# Patient Record
Sex: FEMALE | Race: OTHER | NOT HISPANIC OR LATINO | Employment: OTHER | URBAN - METROPOLITAN AREA
[De-identification: names, ages, dates, MRNs, and addresses within clinical notes are randomized per-mention and may not be internally consistent; named-entity substitution may affect disease eponyms.]

---

## 2017-06-07 NOTE — PATIENT DISCUSSION
(H32.4830) Nonexudative age-related macular degeneration bilateral anthony - Assesment : Examination revealed AMD Dry- - Plan : Patient advised to check Amsler Grid regularly (once weekly or more) and use nutraceuticals such as AREDS 2 eye vitamins. Wear sunglasses when outdoors and eat green, leafy vegetables to maintain ocular health. Return to clinic in one year for Macular Fundus Photos and Exam. Optical coherence tomography performed.

## 2018-06-14 NOTE — PATIENT DISCUSSION
(X20.3246) Nonexudative age-related macular degeneration bilateral anthony - Assesment : Examination revealed AMD Dry. OD visual acuity decreased and not entirely consistent with exam findings. recommend further evaluation and testing of OD. - Plan : Patient advised to check Amsler Grid regularly (once weekly or more) and use nutraceuticals such as AREDS 2 eye vitamins. Wear sunglasses when outdoors and eat green, leafy vegetables to maintain ocular health.   Rtc 1 months follow up/refraction/octm(if no improvement in va OD consider VF)

## 2018-07-11 NOTE — PATIENT DISCUSSION
(M05.4441) Nonexudative age-related macular degeneration bilateral anthony - Assesment : Examination revealed AMD Dry. OD visual acuity improved on today findings since last visit Unable to examine patient,seconday to patient had to leave for another appt. Called patient with exam findings and oct findings - Plan : Patient advised to check Amsler Grid regularly (once weekly or more) and use nutraceuticals such as AREDS 2 eye vitamins. Wear sunglasses when outdoors and eat green, leafy vegetables to maintain ocular health.   Rtc 07/2019 exam-octm

## 2018-11-29 NOTE — PATIENT DISCUSSION
(H16.222) Keratoconjunct sicca, not specified as Sjogren's, left eye - Assesment : Examination revealed Dry Eye Syndrome - Plan : ATS OS 3-4X DAY  START MAXITROL KERRI OS QHS X 10 NIGHTS THEN STOP  7/2019 EXAM/ MAC OCT

## 2018-12-27 NOTE — PATIENT DISCUSSION
(C02.605) Keratoconjunct sicca, not specified as Sjogren's, left eye - Assesment : Examination revealed Dry Eye Syndrome.   PER PATIENT MAXITROL OINTMENT DIDNT HELP WITH THE DRY EYE. - Plan : LOTEMAX OINTMENT AT NIGHT FOR 10 DAYS (SAMPLES GIVEN) THEN SYSTANE GEL WEAR A SHIELD AT NIGHT OS, WONDERING IF THE EYE IS RUBBING ON THE PILLOW AND ITS IRRITATING IT    7/2019 EXAM/ MAC OCT

## 2019-01-28 NOTE — PATIENT DISCUSSION
(H01.001) Unspecified blepharitis right upper eyelid - Assesment : Examination revealed Blepharitis. - Plan : RE START MAXITROL KERRI TO LASHES AND SMALL AMOUNT INSIDE EYES QHS X 10 NIGHTS THEN STOP  WARM SOAKS BEFORE APPLYING KERRI  OCUSOFT SCRUBS QHS 3-4 NIGHTS WEEK.  -SAMPLE TO PT . GTT INSTRUCTIONS TO PT AND SPOUSE  7/2019 EXAM / MAC OCT

## 2019-01-28 NOTE — PATIENT DISCUSSION
(H01.004) Unspecified blepharitis left upper eyelid - Assesment : Examination revealed Blepharitis.  - Plan : SEE PLAN 1

## 2019-11-13 ENCOUNTER — NEW PATIENT COMPREHENSIVE (OUTPATIENT)
Dept: URBAN - METROPOLITAN AREA CLINIC 35 | Facility: CLINIC | Age: 74
End: 2019-11-13

## 2019-11-13 DIAGNOSIS — E11.9: ICD-10-CM

## 2019-11-13 DIAGNOSIS — H25.813: ICD-10-CM

## 2019-11-13 DIAGNOSIS — H40.023: ICD-10-CM

## 2019-11-13 PROCEDURE — 92015 DETERMINE REFRACTIVE STATE: CPT

## 2019-11-13 PROCEDURE — 92004 COMPRE OPH EXAM NEW PT 1/>: CPT

## 2019-11-13 ASSESSMENT — VISUAL ACUITY
OS_SC: 20/40-2
OS_CC: J8
OD_CC: 20/40
OD_SC: 20/60-2
OD_CC: J8-
OS_CC: 20/30-1

## 2019-11-13 ASSESSMENT — TONOMETRY
OS_IOP_MMHG: 16
OD_IOP_MMHG: 15
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
## 2021-02-10 NOTE — PATIENT DISCUSSION
CHALAZION OS:  PROCEED WITH INSISION AND DRAINAGE AND INJECT WITH KENALOGPRESCRIBED WARM COMPRESSES, EYELID SCRUBS AND DOXYCYCLINE 100MG BID PO X 2 WEEKS AND TOBRADEX HARSHAD QHS X 2 WEEKS.

## 2021-02-10 NOTE — PATIENT DISCUSSION
New Prescription: doxycycline hyclate (doxycycline hyclate): tablet: 100 mg 1 tablet twice a day by mouth 02-

## 2021-02-15 ENCOUNTER — ESTABLISHED COMPREHENSIVE EXAM (OUTPATIENT)
Dept: URBAN - METROPOLITAN AREA CLINIC 35 | Facility: CLINIC | Age: 76
End: 2021-02-15

## 2021-02-15 DIAGNOSIS — H35.371: ICD-10-CM

## 2021-02-15 DIAGNOSIS — H40.023: ICD-10-CM

## 2021-02-15 DIAGNOSIS — H25.813: ICD-10-CM

## 2021-02-15 DIAGNOSIS — E11.9: ICD-10-CM

## 2021-02-15 DIAGNOSIS — H52.7: ICD-10-CM

## 2021-02-15 PROCEDURE — 92014 COMPRE OPH EXAM EST PT 1/>: CPT

## 2021-02-15 PROCEDURE — 92015 DETERMINE REFRACTIVE STATE: CPT

## 2021-02-15 PROCEDURE — 92133 CPTRZD OPH DX IMG PST SGM ON: CPT

## 2021-02-15 ASSESSMENT — TONOMETRY
OD_IOP_MMHG: 19
OD_IOP_MMHG: 20
OS_IOP_MMHG: 20

## 2021-02-15 ASSESSMENT — VISUAL ACUITY
OS_SC: 20/60-2
OS_CC: J4-
OD_CC: J3-
OD_PH: 20/40
OD_SC: 20/60-2
OS_PH: 20/30-1

## 2021-03-01 ENCOUNTER — TECH ONLY (OUTPATIENT)
Dept: URBAN - METROPOLITAN AREA CLINIC 39 | Facility: CLINIC | Age: 76
End: 2021-03-01

## 2021-03-01 DIAGNOSIS — H40.023: ICD-10-CM

## 2021-03-01 DIAGNOSIS — H40.1130: ICD-10-CM

## 2021-03-01 PROCEDURE — 99211T TECH SERVICE

## 2021-03-01 PROCEDURE — 92083 EXTENDED VISUAL FIELD XM: CPT

## 2021-03-16 ENCOUNTER — FOLLOW UP (OUTPATIENT)
Dept: URBAN - METROPOLITAN AREA CLINIC 35 | Facility: CLINIC | Age: 76
End: 2021-03-16

## 2021-03-16 DIAGNOSIS — E11.3293: ICD-10-CM

## 2021-03-16 DIAGNOSIS — H35.351: ICD-10-CM

## 2021-03-16 PROCEDURE — 92012 INTRM OPH EXAM EST PATIENT: CPT

## 2021-03-16 PROCEDURE — 92134 CPTRZ OPH DX IMG PST SGM RTA: CPT

## 2021-03-16 ASSESSMENT — VISUAL ACUITY
OS_SC: 20/50-1
OD_SC: 20/50-2

## 2021-03-16 ASSESSMENT — TONOMETRY
OD_IOP_MMHG: 18
OS_IOP_MMHG: 17

## 2021-03-29 ENCOUNTER — CONSULT (OUTPATIENT)
Dept: URBAN - METROPOLITAN AREA CLINIC 39 | Facility: CLINIC | Age: 76
End: 2021-03-29

## 2021-03-29 DIAGNOSIS — H40.1112: ICD-10-CM

## 2021-03-29 DIAGNOSIS — H35.351: ICD-10-CM

## 2021-03-29 DIAGNOSIS — E11.3293: ICD-10-CM

## 2021-03-29 DIAGNOSIS — H35.371: ICD-10-CM

## 2021-03-29 DIAGNOSIS — H40.1121: ICD-10-CM

## 2021-03-29 PROCEDURE — 76514 ECHO EXAM OF EYE THICKNESS: CPT

## 2021-03-29 PROCEDURE — 92250 FUNDUS PHOTOGRAPHY W/I&R: CPT

## 2021-03-29 PROCEDURE — 92020 GONIOSCOPY: CPT

## 2021-03-29 PROCEDURE — 92014 COMPRE OPH EXAM EST PT 1/>: CPT

## 2021-03-29 ASSESSMENT — VISUAL ACUITY
OS_SC: 20/50-2
OD_CC: J3
OS_PH: 20/40
OD_SC: 20/50-2
OS_CC: J3
OD_PH: 20/40

## 2021-03-29 ASSESSMENT — TONOMETRY
OD_IOP_MMHG: 15
OS_IOP_MMHG: 15

## 2021-03-29 ASSESSMENT — PACHYMETRY
OS_CT_UM: 541
OD_CT_UM: 549

## 2021-04-01 ENCOUNTER — RETINA CONSULT (OUTPATIENT)
Dept: URBAN - METROPOLITAN AREA CLINIC 35 | Facility: CLINIC | Age: 76
End: 2021-04-01

## 2021-04-01 DIAGNOSIS — H35.371: ICD-10-CM

## 2021-04-01 DIAGNOSIS — E11.3212: ICD-10-CM

## 2021-04-01 DIAGNOSIS — E11.3211: ICD-10-CM

## 2021-04-01 DIAGNOSIS — E11.9: ICD-10-CM

## 2021-04-01 DIAGNOSIS — H35.033: ICD-10-CM

## 2021-04-01 PROCEDURE — 92235 FLUORESCEIN ANGRPH MLTIFRAME: CPT

## 2021-04-01 PROCEDURE — 92250 FUNDUS PHOTOGRAPHY W/I&R: CPT

## 2021-04-01 PROCEDURE — 92273 FULL FIELD ERG W/I&R: CPT

## 2021-04-01 PROCEDURE — 6702850 BILATERAL INTRAVITREAL INJECTION

## 2021-04-01 PROCEDURE — 92287 ANT SGM IMG IR FLRSCN ANGRPH: CPT

## 2021-04-01 PROCEDURE — 99214 OFFICE O/P EST MOD 30 MIN: CPT

## 2021-04-01 ASSESSMENT — VISUAL ACUITY
OS_SC: 20/50-2
OD_SC: 20/60-1
OS_PH: 20/40-2
OD_PH: 20/40-2

## 2021-04-01 ASSESSMENT — TONOMETRY
OS_IOP_MMHG: 14
OD_IOP_MMHG: 16

## 2021-05-06 ENCOUNTER — ESTABLISHED PATIENT (OUTPATIENT)
Dept: URBAN - METROPOLITAN AREA CLINIC 35 | Facility: CLINIC | Age: 76
End: 2021-05-06

## 2021-05-06 DIAGNOSIS — H43.813: ICD-10-CM

## 2021-05-06 DIAGNOSIS — E11.3211: ICD-10-CM

## 2021-05-06 DIAGNOSIS — H35.371: ICD-10-CM

## 2021-05-06 DIAGNOSIS — H35.033: ICD-10-CM

## 2021-05-06 DIAGNOSIS — E11.3212: ICD-10-CM

## 2021-05-06 PROCEDURE — 92134 CPTRZ OPH DX IMG PST SGM RTA: CPT

## 2021-05-06 PROCEDURE — 92202 OPSCPY EXTND ON/MAC DRAW: CPT

## 2021-05-06 PROCEDURE — 99213 OFFICE O/P EST LOW 20 MIN: CPT

## 2021-05-06 PROCEDURE — 6702850 BILATERAL INTRAVITREAL INJECTION

## 2021-05-06 ASSESSMENT — TONOMETRY
OD_IOP_MMHG: 13
OS_IOP_MMHG: 14

## 2021-05-06 ASSESSMENT — VISUAL ACUITY
OS_SC: 20/40-1
OD_SC: 20/60-2
OD_PH: 20/40-1

## 2021-05-10 ENCOUNTER — IOP CHECK (OUTPATIENT)
Dept: URBAN - METROPOLITAN AREA CLINIC 39 | Facility: CLINIC | Age: 76
End: 2021-05-10

## 2021-05-10 DIAGNOSIS — H40.1112: ICD-10-CM

## 2021-05-10 DIAGNOSIS — H40.1121: ICD-10-CM

## 2021-05-10 PROCEDURE — 92012 INTRM OPH EXAM EST PATIENT: CPT

## 2021-05-10 RX ORDER — DORZOLAMIDE HYDROCHLORIDE TIMOLOL MALEATE 20; 5 MG/ML; MG/ML: 1 SOLUTION/ DROPS OPHTHALMIC TWICE A DAY

## 2021-05-10 ASSESSMENT — VISUAL ACUITY
OS_PH: 20/30-2
OD_PH: 20/40-1
OS_SC: 20/40-2
OD_SC: 20/60+2

## 2021-05-10 ASSESSMENT — TONOMETRY
OS_IOP_MMHG: 11
OD_IOP_MMHG: 11

## 2021-06-10 ENCOUNTER — ESTABLISHED PATIENT (OUTPATIENT)
Dept: URBAN - METROPOLITAN AREA CLINIC 35 | Facility: CLINIC | Age: 76
End: 2021-06-10

## 2021-06-10 DIAGNOSIS — H35.033: ICD-10-CM

## 2021-06-10 DIAGNOSIS — E11.3211: ICD-10-CM

## 2021-06-10 DIAGNOSIS — E11.3212: ICD-10-CM

## 2021-06-10 DIAGNOSIS — H43.813: ICD-10-CM

## 2021-06-10 DIAGNOSIS — H35.371: ICD-10-CM

## 2021-06-10 PROCEDURE — 92250 FUNDUS PHOTOGRAPHY W/I&R: CPT

## 2021-06-10 PROCEDURE — 6702850 BILATERAL INTRAVITREAL INJECTION

## 2021-06-10 PROCEDURE — 92273 FULL FIELD ERG W/I&R: CPT

## 2021-06-10 PROCEDURE — 99213 OFFICE O/P EST LOW 20 MIN: CPT

## 2021-06-10 ASSESSMENT — VISUAL ACUITY
OS_CC: 20/50+2
OS_PH: 20/40
OD_CC: 20/40+1

## 2021-06-10 ASSESSMENT — TONOMETRY
OD_IOP_MMHG: 15
OS_IOP_MMHG: 15

## 2021-09-16 ENCOUNTER — FOLLOW UP (OUTPATIENT)
Dept: URBAN - METROPOLITAN AREA CLINIC 35 | Facility: CLINIC | Age: 76
End: 2021-09-16

## 2021-09-16 DIAGNOSIS — H43.813: ICD-10-CM

## 2021-09-16 DIAGNOSIS — H35.033: ICD-10-CM

## 2021-09-16 DIAGNOSIS — E11.3213: ICD-10-CM

## 2021-09-16 DIAGNOSIS — H35.371: ICD-10-CM

## 2021-09-16 PROCEDURE — 92287 ANT SGM IMG IR FLRSCN ANGRPH: CPT

## 2021-09-16 PROCEDURE — 92273 FULL FIELD ERG W/I&R: CPT

## 2021-09-16 PROCEDURE — 92250 FUNDUS PHOTOGRAPHY W/I&R: CPT

## 2021-09-16 PROCEDURE — 92235 FLUORESCEIN ANGRPH MLTIFRAME: CPT

## 2021-09-16 PROCEDURE — 99213 OFFICE O/P EST LOW 20 MIN: CPT

## 2021-09-16 ASSESSMENT — TONOMETRY
OD_IOP_MMHG: 13
OS_IOP_MMHG: 14

## 2021-09-16 ASSESSMENT — VISUAL ACUITY
OS_SC: 20/50+2
OD_SC: 20/40+2

## 2021-12-20 ENCOUNTER — COMPREHENSIVE EXAM (OUTPATIENT)
Dept: URBAN - METROPOLITAN AREA CLINIC 35 | Facility: CLINIC | Age: 76
End: 2021-12-20

## 2021-12-20 DIAGNOSIS — H35.033: ICD-10-CM

## 2021-12-20 DIAGNOSIS — E11.3212: ICD-10-CM

## 2021-12-20 DIAGNOSIS — H25.813: ICD-10-CM

## 2021-12-20 DIAGNOSIS — E11.3211: ICD-10-CM

## 2021-12-20 DIAGNOSIS — H43.813: ICD-10-CM

## 2021-12-20 DIAGNOSIS — H40.1112: ICD-10-CM

## 2021-12-20 DIAGNOSIS — H40.1121: ICD-10-CM

## 2021-12-20 DIAGNOSIS — H35.371: ICD-10-CM

## 2021-12-20 DIAGNOSIS — H52.7: ICD-10-CM

## 2021-12-20 PROCEDURE — 92014 COMPRE OPH EXAM EST PT 1/>: CPT

## 2021-12-20 PROCEDURE — 92134 CPTRZ OPH DX IMG PST SGM RTA: CPT

## 2021-12-20 PROCEDURE — 92015 DETERMINE REFRACTIVE STATE: CPT

## 2021-12-20 ASSESSMENT — TONOMETRY
OS_IOP_MMHG: 16
OD_IOP_MMHG: 16

## 2021-12-20 ASSESSMENT — VISUAL ACUITY
OS_CC: 20/40-1
OD_CC: J8
OS_CC: J10
OD_CC: 20/40-1

## 2022-07-13 ENCOUNTER — CONSULTATION/EVALUATION (OUTPATIENT)
Dept: URBAN - METROPOLITAN AREA CLINIC 39 | Facility: CLINIC | Age: 77
End: 2022-07-13

## 2022-07-13 DIAGNOSIS — H40.1112: ICD-10-CM

## 2022-07-13 DIAGNOSIS — H25.813: ICD-10-CM

## 2022-07-13 DIAGNOSIS — H40.1121: ICD-10-CM

## 2022-07-13 DIAGNOSIS — E11.3213: ICD-10-CM

## 2022-07-13 DIAGNOSIS — H43.813: ICD-10-CM

## 2022-07-13 PROCEDURE — 92250 FUNDUS PHOTOGRAPHY W/I&R: CPT

## 2022-07-13 PROCEDURE — 92020 GONIOSCOPY: CPT

## 2022-07-13 PROCEDURE — 92014 COMPRE OPH EXAM EST PT 1/>: CPT

## 2022-07-13 ASSESSMENT — VISUAL ACUITY
OS_SC: <J12
OD_CC: J1
OS_SC: 20/40+2
OS_CC: J1
OD_SC: 20/40+1
OD_CC: 20/30+2
OS_CC: 20/25-2
OD_SC: J12

## 2022-07-13 ASSESSMENT — TONOMETRY
OD_IOP_MMHG: 19
OS_IOP_MMHG: 19

## 2023-09-13 ENCOUNTER — COMPREHENSIVE EXAM (OUTPATIENT)
Dept: URBAN - METROPOLITAN AREA CLINIC 39 | Facility: CLINIC | Age: 78
End: 2023-09-13

## 2023-09-13 DIAGNOSIS — H25.813: ICD-10-CM

## 2023-09-13 DIAGNOSIS — H40.1112: ICD-10-CM

## 2023-09-13 DIAGNOSIS — H40.1121: ICD-10-CM

## 2023-09-13 PROCEDURE — 92250 FUNDUS PHOTOGRAPHY W/I&R: CPT

## 2023-09-13 PROCEDURE — 92014 COMPRE OPH EXAM EST PT 1/>: CPT

## 2023-09-13 ASSESSMENT — VISUAL ACUITY
OS_CC: J3
OD_CC: 20/30
OD_SC: J12
OS_CC: 20/40
OD_SC: 20/60+2
OS_SC: 20/50-1
OD_CC: J1
OS_SC: J12

## 2023-09-13 ASSESSMENT — TONOMETRY
OD_IOP_MMHG: 18
OS_IOP_MMHG: 17

## 2024-05-02 ENCOUNTER — FOLLOW UP (OUTPATIENT)
Dept: URBAN - METROPOLITAN AREA CLINIC 35 | Facility: CLINIC | Age: 79
End: 2024-05-02

## 2024-05-02 DIAGNOSIS — H40.1121: ICD-10-CM

## 2024-05-02 DIAGNOSIS — H40.1112: ICD-10-CM

## 2024-05-02 DIAGNOSIS — E11.3213: ICD-10-CM

## 2024-05-02 DIAGNOSIS — H35.371: ICD-10-CM

## 2024-05-02 DIAGNOSIS — H25.813: ICD-10-CM

## 2024-05-02 DIAGNOSIS — H35.033: ICD-10-CM

## 2024-05-02 PROCEDURE — 92134 CPTRZ OPH DX IMG PST SGM RTA: CPT

## 2024-05-02 PROCEDURE — 92012 INTRM OPH EXAM EST PATIENT: CPT

## 2024-05-02 PROCEDURE — 92250 FUNDUS PHOTOGRAPHY W/I&R: CPT

## 2024-05-02 ASSESSMENT — TONOMETRY
OD_IOP_MMHG: 17
OS_IOP_MMHG: 19

## 2024-05-02 ASSESSMENT — VISUAL ACUITY
OS_CC: 20/50-2
OU_CC: 20/30-1
OD_CC: 20/30-1

## 2024-07-30 ENCOUNTER — CONSULTATION/EVALUATION (OUTPATIENT)
Dept: URBAN - METROPOLITAN AREA CLINIC 39 | Facility: CLINIC | Age: 79
End: 2024-07-30

## 2024-11-21 ENCOUNTER — COMPREHENSIVE EXAM (OUTPATIENT)
Dept: URBAN - METROPOLITAN AREA CLINIC 35 | Facility: CLINIC | Age: 79
End: 2024-11-21

## 2024-11-21 DIAGNOSIS — H35.371: ICD-10-CM

## 2024-11-21 DIAGNOSIS — E11.3213: ICD-10-CM

## 2024-11-21 DIAGNOSIS — H43.813: ICD-10-CM

## 2024-11-21 DIAGNOSIS — H40.1112: ICD-10-CM

## 2024-11-21 DIAGNOSIS — H35.033: ICD-10-CM

## 2024-11-21 DIAGNOSIS — H40.1121: ICD-10-CM

## 2024-11-21 PROCEDURE — 67028 INJECTION EYE DRUG: CPT

## 2024-11-21 PROCEDURE — 92250 FUNDUS PHOTOGRAPHY W/I&R: CPT

## 2024-11-21 PROCEDURE — J0178PRE EYLEA PREFILLED SYRINGE: Mod: JZ,LT

## 2024-11-21 PROCEDURE — 92273 FULL FIELD ERG W/I&R: CPT

## 2024-11-21 PROCEDURE — 99214 OFFICE O/P EST MOD 30 MIN: CPT | Mod: 25

## 2025-01-30 ENCOUNTER — COMPREHENSIVE EXAM (OUTPATIENT)
Age: 80
End: 2025-01-30

## 2025-01-30 DIAGNOSIS — H35.033: ICD-10-CM

## 2025-01-30 DIAGNOSIS — H40.1112: ICD-10-CM

## 2025-01-30 DIAGNOSIS — E11.3213: ICD-10-CM

## 2025-01-30 DIAGNOSIS — H35.09: ICD-10-CM

## 2025-01-30 DIAGNOSIS — H40.1121: ICD-10-CM

## 2025-01-30 DIAGNOSIS — H43.813: ICD-10-CM

## 2025-01-30 PROCEDURE — 99213 OFFICE O/P EST LOW 20 MIN: CPT | Mod: 25

## 2025-01-30 PROCEDURE — 67028 INJECTION EYE DRUG: CPT

## 2025-01-30 PROCEDURE — J0178PRE EYLEA PREFILLED SYRINGE: Mod: JZ,LT

## 2025-01-30 PROCEDURE — 92134 CPTRZ OPH DX IMG PST SGM RTA: CPT

## 2025-05-30 ENCOUNTER — COMPREHENSIVE EXAM (OUTPATIENT)
Age: 80
End: 2025-05-30

## 2025-05-30 DIAGNOSIS — H35.033: ICD-10-CM

## 2025-05-30 DIAGNOSIS — H40.1121: ICD-10-CM

## 2025-05-30 DIAGNOSIS — H43.813: ICD-10-CM

## 2025-05-30 DIAGNOSIS — H35.09: ICD-10-CM

## 2025-05-30 DIAGNOSIS — H40.1112: ICD-10-CM

## 2025-05-30 DIAGNOSIS — E11.3213: ICD-10-CM

## 2025-05-30 PROCEDURE — 92134 CPTRZ OPH DX IMG PST SGM RTA: CPT

## 2025-05-30 PROCEDURE — 99213 OFFICE O/P EST LOW 20 MIN: CPT

## 2025-07-29 ENCOUNTER — FOLLOW UP (OUTPATIENT)
Age: 80
End: 2025-07-29

## 2025-07-29 DIAGNOSIS — H40.1121: ICD-10-CM

## 2025-07-29 DIAGNOSIS — E11.3213: ICD-10-CM

## 2025-07-29 DIAGNOSIS — H35.033: ICD-10-CM

## 2025-07-29 DIAGNOSIS — H43.813: ICD-10-CM

## 2025-07-29 DIAGNOSIS — H40.1112: ICD-10-CM

## 2025-07-29 DIAGNOSIS — H35.09: ICD-10-CM

## 2025-07-29 PROCEDURE — 99213 OFFICE O/P EST LOW 20 MIN: CPT | Mod: 25,57

## 2025-07-29 PROCEDURE — 92250 FUNDUS PHOTOGRAPHY W/I&R: CPT

## 2025-07-29 PROCEDURE — 67210 TREATMENT OF RETINAL LESION: CPT

## 2025-07-29 PROCEDURE — 92273 FULL FIELD ERG W/I&R: CPT
